# Patient Record
Sex: MALE | URBAN - METROPOLITAN AREA
[De-identification: names, ages, dates, MRNs, and addresses within clinical notes are randomized per-mention and may not be internally consistent; named-entity substitution may affect disease eponyms.]

---

## 2019-10-28 ENCOUNTER — OFFICE VISIT (OUTPATIENT)
Dept: URBAN - METROPOLITAN AREA CLINIC 58 | Facility: CLINIC | Age: 64
End: 2019-10-28
Payer: MEDICARE

## 2019-10-28 DIAGNOSIS — H25.13 AGE-RELATED NUCLEAR CATARACT, BILATERAL: ICD-10-CM

## 2019-10-28 DIAGNOSIS — H33.302 UNSPECIFIED RETINAL BREAK, LEFT EYE: Primary | ICD-10-CM

## 2019-10-28 DIAGNOSIS — H43.12 VITREOUS HEMORRHAGE, LEFT EYE: ICD-10-CM

## 2019-10-28 PROCEDURE — 92004 COMPRE OPH EXAM NEW PT 1/>: CPT | Performed by: OPTOMETRIST

## 2019-10-28 ASSESSMENT — INTRAOCULAR PRESSURE
OD: 18
OS: 18

## 2019-10-28 NOTE — IMPRESSION/PLAN
Impression: Unspecified retinal break, left eye: H33.302. Plan: Discussed diagnosis in detail with patient.   Prompt consultation with retina for eval/treatment

## 2019-10-28 NOTE — IMPRESSION/PLAN
Impression: Age-related nuclear cataract, bilateral: H25.13. Plan: Discussed diagnosis in detail with patient. No treatment is required at this time. Will continue to observe condition and or symptoms. Call if [address] worsens.